# Patient Record
Sex: MALE | Race: BLACK OR AFRICAN AMERICAN
[De-identification: names, ages, dates, MRNs, and addresses within clinical notes are randomized per-mention and may not be internally consistent; named-entity substitution may affect disease eponyms.]

---

## 2021-11-18 ENCOUNTER — HOSPITAL ENCOUNTER (INPATIENT)
Dept: HOSPITAL 49 - FER | Age: 61
LOS: 4 days | Discharge: HOME | DRG: 375 | End: 2021-11-22
Attending: INTERNAL MEDICINE | Admitting: INTERNAL MEDICINE
Payer: COMMERCIAL

## 2021-11-18 VITALS — WEIGHT: 221.31 LBS | HEIGHT: 70 IN | BODY MASS INDEX: 31.68 KG/M2

## 2021-11-18 DIAGNOSIS — C18.0: Primary | ICD-10-CM

## 2021-11-18 DIAGNOSIS — D50.9: ICD-10-CM

## 2021-11-18 DIAGNOSIS — Z80.42: ICD-10-CM

## 2021-11-18 DIAGNOSIS — Z82.49: ICD-10-CM

## 2021-11-18 DIAGNOSIS — Z98.890: ICD-10-CM

## 2021-11-18 DIAGNOSIS — Z83.3: ICD-10-CM

## 2021-11-18 DIAGNOSIS — K63.5: ICD-10-CM

## 2021-11-18 DIAGNOSIS — K57.30: ICD-10-CM

## 2021-11-18 DIAGNOSIS — Z87.442: ICD-10-CM

## 2021-11-18 DIAGNOSIS — K92.2: ICD-10-CM

## 2021-11-18 DIAGNOSIS — I10: ICD-10-CM

## 2021-11-18 DIAGNOSIS — Z79.899: ICD-10-CM

## 2021-11-18 DIAGNOSIS — D63.0: ICD-10-CM

## 2021-11-18 DIAGNOSIS — Z20.822: ICD-10-CM

## 2021-11-18 LAB
ALBUMIN SERPL-MCNC: 3.3 G/DL (ref 3.4–5)
ALKALINE PHOSHATASE: 102 U/L (ref 46–116)
ALT SERPL-CCNC: 26 U/L (ref 16–63)
AST: 15 U/L (ref 15–37)
BASOPHIL: 0.2 % (ref 0–2)
BILIRUBIN - TOTAL: 0.3 MG/DL (ref 0.2–1)
BUN SERPL-MCNC: 19 MG/DL (ref 7–18)
BUN/CREAT RATIO (CALC): 18.6 RATIO
CHLORIDE: 103 MMOL/L (ref 98–107)
CO2 (BICARBONATE): 24 MMOL/L (ref 21–32)
CREATININE: 1.02 MG/DL (ref 0.67–1.17)
EOSINOPHIL: 0 % (ref 0–5)
GLOBULIN (CALCULATION): 4.2 G/DL
GLUCOSE SERPL-MCNC: 137 MG/DL (ref 74–106)
HCT: 24.5 % (ref 42–52)
HGB BLD-MCNC: 5.9 G/DL (ref 13.2–18)
INR PPP: 1.1 (ref 0.9–1.2)
LYMPHOCYTE: 13.9 % (ref 15–48)
MCH RBC QN AUTO: 14.4 PG (ref 25–31)
MCHC RBC AUTO-ENTMCNC: 24.1 G/DL (ref 32–36)
MCV: 59.8 FL (ref 78–100)
MONOCYTE: 10.9 % (ref 0–12)
MPV: 9.2 FL (ref 6–9.5)
NEUTROPHIL: 74.3 % (ref 41–80)
NRBC: 0.7
PLT: 628 K/UL (ref 150–400)
POTASSIUM: 4.2 MMOL/L (ref 3.5–5.1)
PROTHROMBIN TIME: 13.6 SECONDS (ref 11.8–13.4)
PTT: 26.3 SECONDS (ref 24.4–34.7)
RBC MORPHOLOGY: (no result)
RBC: 4.1 M/UL (ref 4.7–6)
RDW: 21.9 % (ref 11.5–14)
RETICULOCYTE COUNT: 2.1 % (ref 1–2)
TOTAL PROTEIN: 7.5 G/DL (ref 6.4–8.2)
WBC: 13.7 K/UL (ref 4–10.5)

## 2021-11-18 PROCEDURE — 30233N1 TRANSFUSION OF NONAUTOLOGOUS RED BLOOD CELLS INTO PERIPHERAL VEIN, PERCUTANEOUS APPROACH: ICD-10-PCS | Performed by: INTERNAL MEDICINE

## 2021-11-18 PROCEDURE — P9016 RBC LEUKOCYTES REDUCED: HCPCS

## 2021-11-18 PROCEDURE — U0002 COVID-19 LAB TEST NON-CDC: HCPCS

## 2021-11-19 LAB
BILIRUBIN: NEGATIVE MG/DL
BLOOD: NEGATIVE ERY/UL
CLARITY UR: CLEAR
COLOR: YELLOW
GLUCOSE (U): NORMAL MG/DL
HCT: 29.2 % (ref 42–52)
HGB BLD-MCNC: 7.7 G/DL (ref 13.2–18)
IRON % SATURATION: 3.4 %SAT (ref 20–50)
IRON SERPL-MCNC: 13 UG/DL (ref 65–175)
LEUKOCYTES: NEGATIVE LEU/UL
NITRITE: NEGATIVE MG/DL
PROTEIN: NEGATIVE MG/DL
SPECIFIC GRAVITY: 1.01 (ref 1–1.03)
UROBILINOGEN: 0.2 MG/DL (ref 0.2–1)

## 2021-11-19 PROCEDURE — 30233N1 TRANSFUSION OF NONAUTOLOGOUS RED BLOOD CELLS INTO PERIPHERAL VEIN, PERCUTANEOUS APPROACH: ICD-10-PCS | Performed by: INTERNAL MEDICINE

## 2021-11-20 LAB
BASOPHIL: 0.7 % (ref 0–2)
BUN SERPL-MCNC: 12 MG/DL (ref 7–18)
BUN/CREAT RATIO (CALC): 12.4 RATIO
CHLORIDE: 105 MMOL/L (ref 98–107)
CO2 (BICARBONATE): 25 MMOL/L (ref 21–32)
CREATININE: 0.97 MG/DL (ref 0.67–1.17)
EOSINOPHIL: 4.9 % (ref 0–5)
GLUCOSE SERPL-MCNC: 119 MG/DL (ref 74–106)
HCT: 29.5 % (ref 42–52)
HGB BLD-MCNC: 7.9 G/DL (ref 13.2–18)
LYMPHOCYTE: 29 % (ref 15–48)
MCH RBC QN AUTO: 16.9 PG (ref 25–31)
MCHC RBC AUTO-ENTMCNC: 26.8 G/DL (ref 32–36)
MCV: 63.2 FL (ref 78–100)
MONOCYTE: 14.3 % (ref 0–12)
MPV: 9 FL (ref 6–9.5)
NEUTROPHIL: 50.5 % (ref 41–80)
NRBC: 2
PLT: 525 K/UL (ref 150–400)
POTASSIUM: 3.9 MMOL/L (ref 3.5–5.1)
RBC MORPHOLOGY: (no result)
RBC: 4.67 M/UL (ref 4.7–6)
RDW: 26.9 % (ref 11.5–14)
WBC: 9.7 K/UL (ref 4–10.5)

## 2021-11-21 LAB
BASOPHIL: 0.7 % (ref 0–2)
BUN SERPL-MCNC: 14 MG/DL (ref 7–18)
BUN/CREAT RATIO (CALC): 14.3 RATIO
CHLORIDE: 106 MMOL/L (ref 98–107)
CO2 (BICARBONATE): 25 MMOL/L (ref 21–32)
CREATININE: 0.98 MG/DL (ref 0.67–1.17)
EOSINOPHIL: 4.3 % (ref 0–5)
GLUCOSE SERPL-MCNC: 117 MG/DL (ref 74–106)
HCT: 31.7 % (ref 42–52)
HGB BLD-MCNC: 8.2 G/DL (ref 13.2–18)
LYMPHOCYTE: 30.2 % (ref 15–48)
MCH RBC QN AUTO: 16.8 PG (ref 25–31)
MCHC RBC AUTO-ENTMCNC: 25.9 G/DL (ref 32–36)
MCV: 64.8 FL (ref 78–100)
MONOCYTE: 14.6 % (ref 0–12)
MPV: 8.6 FL (ref 6–9.5)
NEUTROPHIL: 49.2 % (ref 41–80)
NRBC: 1
PLT: 483 K/UL (ref 150–400)
POTASSIUM: 4.2 MMOL/L (ref 3.5–5.1)
RBC MORPHOLOGY: (no result)
RBC: 4.89 M/UL (ref 4.7–6)
RDW: 27.5 % (ref 11.5–14)
WBC: 11.5 K/UL (ref 4–10.5)

## 2021-11-22 LAB
BASOPHIL: 0.6 % (ref 0–2)
BUN SERPL-MCNC: 11 MG/DL (ref 7–18)
BUN/CREAT RATIO (CALC): 10.7 RATIO
CHLORIDE: 105 MMOL/L (ref 98–107)
CO2 (BICARBONATE): 24 MMOL/L (ref 21–32)
CREATININE: 1.03 MG/DL (ref 0.67–1.17)
EOSINOPHIL: 4.3 % (ref 0–5)
GLUCOSE SERPL-MCNC: 117 MG/DL (ref 74–106)
HCT: 33.4 % (ref 42–52)
HGB BLD-MCNC: 8.7 G/DL (ref 13.2–18)
LYMPHOCYTE: 29.3 % (ref 15–48)
MCH RBC QN AUTO: 16.9 PG (ref 25–31)
MCHC RBC AUTO-ENTMCNC: 26 G/DL (ref 32–36)
MCV: 65 FL (ref 78–100)
MONOCYTE: 11.8 % (ref 0–12)
MPV: 9 FL (ref 6–9.5)
NEUTROPHIL: 52.6 % (ref 41–80)
NRBC: 0.4
PLT: 525 K/UL (ref 150–400)
POTASSIUM: 4 MMOL/L (ref 3.5–5.1)
RBC MORPHOLOGY: (no result)
RBC: 5.14 M/UL (ref 4.7–6)
RDW: 28.7 % (ref 11.5–14)
WBC: 11.1 K/UL (ref 4–10.5)

## 2021-11-22 PROCEDURE — 0DBE8ZZ EXCISION OF LARGE INTESTINE, VIA NATURAL OR ARTIFICIAL OPENING ENDOSCOPIC: ICD-10-PCS | Performed by: STUDENT IN AN ORGANIZED HEALTH CARE EDUCATION/TRAINING PROGRAM

## 2021-11-22 PROCEDURE — 0DBH8ZX EXCISION OF CECUM, VIA NATURAL OR ARTIFICIAL OPENING ENDOSCOPIC, DIAGNOSTIC: ICD-10-PCS | Performed by: STUDENT IN AN ORGANIZED HEALTH CARE EDUCATION/TRAINING PROGRAM

## 2021-11-22 NOTE — NUR
11/22/21 Mr. Joshua lives at home with his S.o. He is independent in the
home and community. No discharge planning needs are anticipated.

## 2021-11-30 ENCOUNTER — HOSPITAL ENCOUNTER (INPATIENT)
Dept: HOSPITAL 49 - FAS | Age: 61
LOS: 4 days | Discharge: HOME | DRG: 330 | End: 2021-12-04
Admitting: INTERNAL MEDICINE
Payer: COMMERCIAL

## 2021-11-30 DIAGNOSIS — C18.0: Primary | ICD-10-CM

## 2021-11-30 DIAGNOSIS — Z82.49: ICD-10-CM

## 2021-11-30 DIAGNOSIS — K66.8: ICD-10-CM

## 2021-11-30 DIAGNOSIS — I11.9: ICD-10-CM

## 2021-11-30 DIAGNOSIS — R18.8: ICD-10-CM

## 2021-11-30 DIAGNOSIS — Z80.42: ICD-10-CM

## 2021-11-30 DIAGNOSIS — Z83.3: ICD-10-CM

## 2021-11-30 DIAGNOSIS — J98.11: ICD-10-CM

## 2021-11-30 DIAGNOSIS — K91.0: ICD-10-CM

## 2021-11-30 DIAGNOSIS — R00.0: ICD-10-CM

## 2021-11-30 DIAGNOSIS — E87.70: ICD-10-CM

## 2021-11-30 DIAGNOSIS — D72.829: ICD-10-CM

## 2021-11-30 DIAGNOSIS — Z20.822: ICD-10-CM

## 2021-11-30 DIAGNOSIS — I31.3: ICD-10-CM

## 2021-11-30 DIAGNOSIS — K64.8: ICD-10-CM

## 2021-11-30 DIAGNOSIS — D64.9: ICD-10-CM

## 2021-11-30 DIAGNOSIS — J90: ICD-10-CM

## 2021-11-30 DIAGNOSIS — N17.9: ICD-10-CM

## 2021-11-30 PROCEDURE — 0DBF0ZZ EXCISION OF RIGHT LARGE INTESTINE, OPEN APPROACH: ICD-10-PCS | Performed by: STUDENT IN AN ORGANIZED HEALTH CARE EDUCATION/TRAINING PROGRAM

## 2021-11-30 PROCEDURE — 0WJG4ZZ INSPECTION OF PERITONEAL CAVITY, PERCUTANEOUS ENDOSCOPIC APPROACH: ICD-10-PCS | Performed by: STUDENT IN AN ORGANIZED HEALTH CARE EDUCATION/TRAINING PROGRAM

## 2021-12-01 PROCEDURE — 30233N1 TRANSFUSION OF NONAUTOLOGOUS RED BLOOD CELLS INTO PERIPHERAL VEIN, PERCUTANEOUS APPROACH: ICD-10-PCS | Performed by: STUDENT IN AN ORGANIZED HEALTH CARE EDUCATION/TRAINING PROGRAM

## 2021-12-02 PROCEDURE — 05HY33Z INSERTION OF INFUSION DEVICE INTO UPPER VEIN, PERCUTANEOUS APPROACH: ICD-10-PCS | Performed by: INTERNAL MEDICINE

## 2021-12-03 PROCEDURE — 0W9G3ZX DRAINAGE OF PERITONEAL CAVITY, PERCUTANEOUS APPROACH, DIAGNOSTIC: ICD-10-PCS
